# Patient Record
(demographics unavailable — no encounter records)

---

## 2025-05-06 NOTE — HEALTH RISK ASSESSMENT
[Yes] : Yes [Monthly or less (1 pt)] : Monthly or less (1 point) [1 or 2 (0 pts)] : 1 or 2 (0 points) [Never (0 pts)] : Never (0 points) [No] : In the past 12 months have you used drugs other than those required for medical reasons? No [No falls in past year] : Patient reported no falls in the past year [0] : 2) Feeling down, depressed, or hopeless: Not at all (0) [PHQ-2 Negative - No further assessment needed] : PHQ-2 Negative - No further assessment needed [0-4] : 0-4 [Former] : Former [NO] : No [HIV test declined] : HIV test declined [Hepatitis C test declined] : Hepatitis C test declined [Alone] : lives alone [Employed] : employed [Single] : single [Feels Safe at Home] : Feels safe at home [Fully functional (bathing, dressing, toileting, transferring, walking, feeding)] : Fully functional (bathing, dressing, toileting, transferring, walking, feeding) [Fully functional (using the telephone, shopping, preparing meals, housekeeping, doing laundry, using] : Fully functional and needs no help or supervision to perform IADLs (using the telephone, shopping, preparing meals, housekeeping, doing laundry, using transportation, managing medications and managing finances) [Reports changes in vision] : Reports changes in vision [Smoke Detector] : smoke detector [Seat Belt] :  uses seat belt [de-identified] : occasionally for social occasions [Audit-CScore] : 1 [de-identified] : walking every day 1 hr [de-identified] : breakfast- oatmeal, milk, bread; lunch- rice, potatoes, chicken soup; dinner- bread, yellow cheese, spring water/coke, ginger ale, cold cut meat or fried  [[id number]] : 0 [de-identified] : when pt was a child- just tried 2 cigarettes [Sexually Active] : not sexually active [Reports changes in hearing] : Reports no changes in hearing [Reports changes in dental health] : Reports no changes in dental health [de-identified] : 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

## 2025-05-06 NOTE — HEALTH RISK ASSESSMENT
[Yes] : Yes [Monthly or less (1 pt)] : Monthly or less (1 point) [1 or 2 (0 pts)] : 1 or 2 (0 points) [Never (0 pts)] : Never (0 points) [No] : In the past 12 months have you used drugs other than those required for medical reasons? No [No falls in past year] : Patient reported no falls in the past year [0] : 2) Feeling down, depressed, or hopeless: Not at all (0) [PHQ-2 Negative - No further assessment needed] : PHQ-2 Negative - No further assessment needed [0-4] : 0-4 [Former] : Former [NO] : No [HIV test declined] : HIV test declined [Hepatitis C test declined] : Hepatitis C test declined [Alone] : lives alone [Employed] : employed [Single] : single [Feels Safe at Home] : Feels safe at home [Fully functional (bathing, dressing, toileting, transferring, walking, feeding)] : Fully functional (bathing, dressing, toileting, transferring, walking, feeding) [Fully functional (using the telephone, shopping, preparing meals, housekeeping, doing laundry, using] : Fully functional and needs no help or supervision to perform IADLs (using the telephone, shopping, preparing meals, housekeeping, doing laundry, using transportation, managing medications and managing finances) [Reports changes in vision] : Reports changes in vision [Smoke Detector] : smoke detector [Seat Belt] :  uses seat belt [de-identified] : occasionally for social occasions [Audit-CScore] : 1 [de-identified] : walking every day 1 hr [de-identified] : breakfast- oatmeal, milk, bread; lunch- rice, potatoes, chicken soup; dinner- bread, yellow cheese, spring water/coke, ginger ale, cold cut meat or fried  [[id number]] : 0 [de-identified] : when pt was a child- just tried 2 cigarettes [Sexually Active] : not sexually active [Reports changes in hearing] : Reports no changes in hearing [Reports changes in dental health] : Reports no changes in dental health [de-identified] : 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

## 2025-05-06 NOTE — PHYSICAL EXAM
[No Acute Distress] : no acute distress [Well Nourished] : well nourished [Well Developed] : well developed [Well-Appearing] : well-appearing [Normal Sclera/Conjunctiva] : normal sclera/conjunctiva [EOMI] : extraocular movements intact [Normal Outer Ear/Nose] : the outer ears and nose were normal in appearance [Normal Oropharynx] : the oropharynx was normal [No JVD] : no jugular venous distention [No Lymphadenopathy] : no lymphadenopathy [Supple] : supple [No Respiratory Distress] : no respiratory distress  [No Accessory Muscle Use] : no accessory muscle use [Clear to Auscultation] : lungs were clear to auscultation bilaterally [Normal Rate] : normal rate  [Regular Rhythm] : with a regular rhythm [Normal S1, S2] : normal S1 and S2 [No Murmur] : no murmur heard [No Carotid Bruits] : no carotid bruits [No Varicosities] : no varicosities [Pedal Pulses Present] : the pedal pulses are present [No Edema] : there was no peripheral edema [Soft] : abdomen soft [Non Tender] : non-tender [Non-distended] : non-distended [No Masses] : no abdominal mass palpated [No HSM] : no HSM [Normal Bowel Sounds] : normal bowel sounds [Normal Posterior Cervical Nodes] : no posterior cervical lymphadenopathy [Normal Anterior Cervical Nodes] : no anterior cervical lymphadenopathy [No Spinal Tenderness] : no spinal tenderness [No Joint Swelling] : no joint swelling [Coordination Grossly Intact] : coordination grossly intact [No Focal Deficits] : no focal deficits [Normal Gait] : normal gait [Normal Affect] : the affect was normal [Normal Insight/Judgement] : insight and judgment were intact [de-identified] : L posterior thigh papule from previous tick bite in 2024

## 2025-05-06 NOTE — PHYSICAL EXAM
[No Acute Distress] : no acute distress [Well Nourished] : well nourished [Well Developed] : well developed [Well-Appearing] : well-appearing [Normal Sclera/Conjunctiva] : normal sclera/conjunctiva [EOMI] : extraocular movements intact [Normal Outer Ear/Nose] : the outer ears and nose were normal in appearance [Normal Oropharynx] : the oropharynx was normal [No JVD] : no jugular venous distention [No Lymphadenopathy] : no lymphadenopathy [Supple] : supple [No Respiratory Distress] : no respiratory distress  [No Accessory Muscle Use] : no accessory muscle use [Clear to Auscultation] : lungs were clear to auscultation bilaterally [Normal Rate] : normal rate  [Regular Rhythm] : with a regular rhythm [Normal S1, S2] : normal S1 and S2 [No Murmur] : no murmur heard [No Carotid Bruits] : no carotid bruits [No Varicosities] : no varicosities [Pedal Pulses Present] : the pedal pulses are present [No Edema] : there was no peripheral edema [Soft] : abdomen soft [Non Tender] : non-tender [Non-distended] : non-distended [No Masses] : no abdominal mass palpated [No HSM] : no HSM [Normal Bowel Sounds] : normal bowel sounds [Normal Posterior Cervical Nodes] : no posterior cervical lymphadenopathy [Normal Anterior Cervical Nodes] : no anterior cervical lymphadenopathy [No Spinal Tenderness] : no spinal tenderness [No Joint Swelling] : no joint swelling [Coordination Grossly Intact] : coordination grossly intact [No Focal Deficits] : no focal deficits [Normal Gait] : normal gait [Normal Affect] : the affect was normal [Normal Insight/Judgement] : insight and judgment were intact [de-identified] : L posterior thigh papule from previous tick bite in 2024

## 2025-05-06 NOTE — ASSESSMENT
[Vaccines Reviewed] : Immunizations reviewed today. Please see immunization details in the vaccine log within the immunization flowsheet.  [FreeTextEntry1] : 67 yo M with PMH depression, GERD, hemorrhoids, rosacea, multiple prior tick bites presenting for CPE.   #HCM - Reports last colonoscopy (about 8 years ago. Reports polyp was found and removed). No other findings. Provided pt w/ FIT testing as per pt preference. - Denies hx of colon cancer in family - Due for Tdap, Prevnar, shingrix. Declines all vaccines at this time.  - Reports getting Tdap 7 years ago. Next due 2028.  SDOH screening completed during CPE/NPA, no issues identified.  Case discussed w/ Dr Martinez.   RTC in 1 year or sooner PRN

## 2025-05-06 NOTE — REVIEW OF SYSTEMS
[Vision Problems] : vision problems [Easy Bleeding] : easy bleeding [Itching] : itching [Fever] : no fever [Chills] : no chills [Night Sweats] : no night sweats [Recent Change In Weight] : ~T no recent weight change [Discharge] : no discharge [Pain] : no pain [Redness] : no redness [Itching] : no itching [Earache] : no earache [Hearing Loss] : no hearing loss [Nosebleeds] : no nosebleeds [Nasal Discharge] : no nasal discharge [Sore Throat] : no sore throat [Hoarseness] : no hoarseness [Chest Pain] : no chest pain [Palpitations] : no palpitations [Orthopena] : no orthopnea [Paroxysmal Nocturnal Dyspnea] : no paroxysmal nocturnal dyspnea [Shortness Of Breath] : no shortness of breath [Wheezing] : no wheezing [Cough] : no cough [Abdominal Pain] : no abdominal pain [Nausea] : no nausea [Constipation] : no constipation [Vomiting] : no vomiting [Heartburn] : no heartburn [Melena] : no melena [Dysuria] : no dysuria [Incontinence] : no incontinence [Hesitancy] : no hesitancy [Nocturia] : no nocturia [Hematuria] : no hematuria [Frequency] : no frequency [Joint Pain] : no joint pain [Joint Stiffness] : no joint stiffness [Muscle Pain] : no muscle pain [Back Pain] : no back pain [Joint Swelling] : no joint swelling [Mole Changes] : no mole changes [Skin Rash] : no skin rash [Headache] : no headache [Dizziness] : no dizziness [Fainting] : no fainting [Memory Loss] : no memory loss [Suicidal] : not suicidal [Insomnia] : no insomnia [Anxiety] : no anxiety [Depression] : no depression [Easy Bruising] : no easy bruising [Swollen Glands] : no swollen glands [FreeTextEntry3] : sees black line in eye occasionally; random onset [de-identified] : LLE tick bite from 2024 [de-identified] : Limited to gum w/ pressure

## 2025-05-06 NOTE — ASSESSMENT
[Vaccines Reviewed] : Immunizations reviewed today. Please see immunization details in the vaccine log within the immunization flowsheet.  [FreeTextEntry1] : 69 yo M with PMH depression, GERD, hemorrhoids, rosacea, multiple prior tick bites presenting for CPE.   #HCM - Reports last colonoscopy (about 8 years ago. Reports polyp was found and removed). No other findings. Provided pt w/ FIT testing as per pt preference. - Denies hx of colon cancer in family - Due for Tdap, Prevnar, shingrix. Declines all vaccines at this time.  - Reports getting Tdap 7 years ago. Next due 2028.  SDOH screening completed during CPE/NPA, no issues identified.  Case discussed w/ Dr Martinez.   RTC in 1 year or sooner PRN

## 2025-05-06 NOTE — HISTORY OF PRESENT ILLNESS
[FreeTextEntry1] : annual visit [de-identified] : 67 yo M with PMH depression, GERD, hemorrhoids, rosacea, multiple prior tick bites presenting for CPE.  #gum bleeding - Went to dentist last month and was told it's due to inflammation. Was told by dentist that pt has rare dental disease (pt cannot recall) - Can't brush teeth at night due to bleeding. Only occurs w/ pressure on gums  - Has been using Parodontax paste given by dentist  - Denies hemoptysis or bleeding at times aside from brushing teeth/increased pressure - Denies easy bruising/bleeding otherwise  #hemorrhoids - Reports bleeding in evenings w/ pressure. No bleeding in AM - Reports improvement w/ hemorrhoidal bleeding when pt eats apples and grapes - Denies dizziness, SOB, chest pain, feeling light-headed  #Rosacea - Reports following w/ dermatology - Applies metronidazole cream w/ relief  #sneezing and coughing  - Occurs only w/ AC in car - Reports improvement in sx when AC not on - Denies any fevers/chills  #Depression - Reports improvement in sx - Denies any SI/HI. Denies taking any medications. Denies talking to therapist and declines talking to therapist/psychiatrist at this time.  #GERD - Takes milk of magnesia about 1/2 cup PRN w/ improvement in sx  #HCM - Reports last colonoscopy (about 8 years ago. Reports polyp was found and removed). No other findings - Denies hx of colon cancer in family - Denies getting shingrix vaccine - Due for Tdap, Prevnar, shingrix.  - Reports getting Tdap 7 years ago.

## 2025-05-06 NOTE — HISTORY OF PRESENT ILLNESS
[FreeTextEntry1] : annual visit [de-identified] : 67 yo M with PMH depression, GERD, hemorrhoids, rosacea, multiple prior tick bites presenting for CPE.  #gum bleeding - Went to dentist last month and was told it's due to inflammation. Was told by dentist that pt has rare dental disease (pt cannot recall) - Can't brush teeth at night due to bleeding. Only occurs w/ pressure on gums  - Has been using Parodontax paste given by dentist  - Denies hemoptysis or bleeding at times aside from brushing teeth/increased pressure - Denies easy bruising/bleeding otherwise  #hemorrhoids - Reports bleeding in evenings w/ pressure. No bleeding in AM - Reports improvement w/ hemorrhoidal bleeding when pt eats apples and grapes - Denies dizziness, SOB, chest pain, feeling light-headed  #Rosacea - Reports following w/ dermatology - Applies metronidazole cream w/ relief  #sneezing and coughing  - Occurs only w/ AC in car - Reports improvement in sx when AC not on - Denies any fevers/chills  #Depression - Reports improvement in sx - Denies any SI/HI. Denies taking any medications. Denies talking to therapist and declines talking to therapist/psychiatrist at this time.  #GERD - Takes milk of magnesia about 1/2 cup PRN w/ improvement in sx  #HCM - Reports last colonoscopy (about 8 years ago. Reports polyp was found and removed). No other findings - Denies hx of colon cancer in family - Denies getting shingrix vaccine - Due for Tdap, Prevnar, shingrix.  - Reports getting Tdap 7 years ago.

## 2025-05-06 NOTE — REVIEW OF SYSTEMS
[Vision Problems] : vision problems [Easy Bleeding] : easy bleeding [Itching] : itching [Fever] : no fever [Chills] : no chills [Night Sweats] : no night sweats [Recent Change In Weight] : ~T no recent weight change [Discharge] : no discharge [Pain] : no pain [Redness] : no redness [Itching] : no itching [Earache] : no earache [Hearing Loss] : no hearing loss [Nosebleeds] : no nosebleeds [Nasal Discharge] : no nasal discharge [Sore Throat] : no sore throat [Hoarseness] : no hoarseness [Chest Pain] : no chest pain [Palpitations] : no palpitations [Orthopena] : no orthopnea [Paroxysmal Nocturnal Dyspnea] : no paroxysmal nocturnal dyspnea [Shortness Of Breath] : no shortness of breath [Wheezing] : no wheezing [Cough] : no cough [Abdominal Pain] : no abdominal pain [Nausea] : no nausea [Constipation] : no constipation [Vomiting] : no vomiting [Heartburn] : no heartburn [Melena] : no melena [Dysuria] : no dysuria [Incontinence] : no incontinence [Hesitancy] : no hesitancy [Nocturia] : no nocturia [Hematuria] : no hematuria [Frequency] : no frequency [Joint Pain] : no joint pain [Joint Stiffness] : no joint stiffness [Muscle Pain] : no muscle pain [Back Pain] : no back pain [Joint Swelling] : no joint swelling [Mole Changes] : no mole changes [Skin Rash] : no skin rash [Headache] : no headache [Dizziness] : no dizziness [Fainting] : no fainting [Memory Loss] : no memory loss [Suicidal] : not suicidal [Insomnia] : no insomnia [Anxiety] : no anxiety [Depression] : no depression [Easy Bruising] : no easy bruising [Swollen Glands] : no swollen glands [FreeTextEntry3] : sees black line in eye occasionally; random onset [de-identified] : LLE tick bite from 2024 [de-identified] : Limited to gum w/ pressure